# Patient Record
Sex: MALE | Race: WHITE | URBAN - METROPOLITAN AREA
[De-identification: names, ages, dates, MRNs, and addresses within clinical notes are randomized per-mention and may not be internally consistent; named-entity substitution may affect disease eponyms.]

---

## 2024-07-20 ENCOUNTER — OFFICE VISIT (OUTPATIENT)
Age: 64
End: 2024-07-20

## 2024-07-20 VITALS
SYSTOLIC BLOOD PRESSURE: 117 MMHG | HEIGHT: 68 IN | DIASTOLIC BLOOD PRESSURE: 74 MMHG | BODY MASS INDEX: 28.34 KG/M2 | HEART RATE: 63 BPM | OXYGEN SATURATION: 94 % | RESPIRATION RATE: 18 BRPM | TEMPERATURE: 98.1 F | WEIGHT: 187 LBS

## 2024-07-20 DIAGNOSIS — R21 RASH: Primary | ICD-10-CM

## 2024-07-20 RX ORDER — DEXAMETHASONE SODIUM PHOSPHATE 10 MG/ML
10 INJECTION INTRAMUSCULAR; INTRAVENOUS ONCE
Status: COMPLETED | OUTPATIENT
Start: 2024-07-20 | End: 2024-07-20

## 2024-07-20 RX ADMIN — DEXAMETHASONE SODIUM PHOSPHATE 10 MG: 10 INJECTION INTRAMUSCULAR; INTRAVENOUS at 15:46

## 2024-07-20 ASSESSMENT — ENCOUNTER SYMPTOMS
NAUSEA: 0
DIARRHEA: 0
ABDOMINAL PAIN: 0
CONSTIPATION: 0
CHEST TIGHTNESS: 0
COUGH: 0
VOMITING: 0
SHORTNESS OF BREATH: 0

## 2024-07-20 NOTE — PROGRESS NOTES
Patricio Poon (:  1960) is a 64 y.o. male,New patient, here for evaluation of the following chief complaint(s):  Poison Ivy (PT. C/O POISON IVY DIFFERENT PARTS OF BODY BACK, BILATERAL WRIST, BILATERAL ANKLE X 3 DAYS.)      ASSESSMENT/PLAN:    ICD-10-CM    1. Rash  R21 dexAMETHasone (DECADRON) injection 10 mg          Patient presents for rash after known exposure to poison ivy  Patient given decadron here and advised to use over counter hydrocortisone cream over the rash  If symptoms worsen please return here or see PCP.       SUBJECTIVE/OBJECTIVE:    History provided by:  Patient   used: No      HPI:   64 y.o. male presents with symptoms of rash to bilaterally wrists, ankle, and back ongoing for three days. He states that hew as exposed and the rash has spread. He has not used any creams or medications for symptoms. He states that he has been exposed to poison ivy before and required one time IM shot.       Vitals:    24 1524   BP: 117/74   Site: Right Upper Arm   Position: Sitting   Cuff Size: Large Adult   Pulse: 63   Resp: 18   Temp: 98.1 °F (36.7 °C)   TempSrc: Oral   SpO2: 94%   Weight: 84.8 kg (187 lb)   Height: 1.727 m (5' 8\")       Review of Systems   Constitutional:  Negative for fatigue and fever.   Respiratory:  Negative for cough, chest tightness and shortness of breath.    Cardiovascular:  Negative for chest pain.   Gastrointestinal:  Negative for abdominal pain, constipation, diarrhea, nausea and vomiting.   Skin:  Positive for rash.       Physical Exam  Vitals and nursing note reviewed.   Constitutional:       Appearance: Normal appearance.   HENT:      Head: Normocephalic and atraumatic.   Eyes:      Extraocular Movements: Extraocular movements intact.      Conjunctiva/sclera: Conjunctivae normal.   Cardiovascular:      Rate and Rhythm: Normal rate and regular rhythm.      Pulses: Normal pulses.   Pulmonary:      Effort: Pulmonary effort is normal.      Breath

## 2024-07-20 NOTE — PATIENT INSTRUCTIONS
Please use over counter hydrocortisone cream over the rash  If symptoms worsen please return here or see PCP.